# Patient Record
Sex: MALE | Race: BLACK OR AFRICAN AMERICAN | NOT HISPANIC OR LATINO | Employment: FULL TIME | URBAN - METROPOLITAN AREA
[De-identification: names, ages, dates, MRNs, and addresses within clinical notes are randomized per-mention and may not be internally consistent; named-entity substitution may affect disease eponyms.]

---

## 2017-01-28 ENCOUNTER — HOSPITAL ENCOUNTER (EMERGENCY)
Facility: HOSPITAL | Age: 32
Discharge: HOME | End: 2017-01-28
Attending: EMERGENCY MEDICINE | Admitting: EMERGENCY MEDICINE
Payer: COMMERCIAL

## 2017-01-28 ENCOUNTER — APPOINTMENT (EMERGENCY)
Dept: RADIOLOGY | Facility: HOSPITAL | Age: 32
End: 2017-01-28
Payer: COMMERCIAL

## 2017-01-28 VITALS
BODY MASS INDEX: 40.43 KG/M2 | DIASTOLIC BLOOD PRESSURE: 90 MMHG | HEIGHT: 74 IN | TEMPERATURE: 99.2 F | SYSTOLIC BLOOD PRESSURE: 151 MMHG | RESPIRATION RATE: 18 BRPM | WEIGHT: 315 LBS | HEART RATE: 98 BPM | OXYGEN SATURATION: 97 %

## 2017-01-28 DIAGNOSIS — R68.89 FLU-LIKE SYMPTOMS: ICD-10-CM

## 2017-01-28 DIAGNOSIS — J45.901 ACUTE ASTHMA EXACERBATION: Primary | ICD-10-CM

## 2017-01-28 DIAGNOSIS — J06.9 VIRAL URI: ICD-10-CM

## 2017-01-28 PROCEDURE — 93005 ELECTROCARDIOGRAM TRACING: CPT | Performed by: EMERGENCY MEDICINE

## 2017-01-28 PROCEDURE — 99285 EMERGENCY DEPT VISIT HI MDM: CPT

## 2017-01-28 PROCEDURE — 71020 HB CHEST X-RAY 2VW FRONTAL&LATL: CPT

## 2017-01-28 PROCEDURE — 94640 AIRWAY INHALATION TREATMENT: CPT

## 2017-01-28 RX ORDER — PREDNISONE 50 MG/1
50 TABLET ORAL DAILY
Qty: 4 TABLET | Refills: 0 | Status: SHIPPED | OUTPATIENT
Start: 2017-01-28 | End: 2017-07-02

## 2017-01-28 RX ORDER — AMLODIPINE BESYLATE 10 MG/1
10 TABLET ORAL DAILY
COMMUNITY

## 2017-01-28 RX ORDER — ALBUTEROL SULFATE 2.5 MG/3ML
2.5 SOLUTION RESPIRATORY (INHALATION) ONCE
Status: COMPLETED | OUTPATIENT
Start: 2017-01-28 | End: 2017-01-28

## 2017-01-28 RX ORDER — IBUPROFEN 800 MG/1
800 TABLET ORAL EVERY 6 HOURS PRN
COMMUNITY

## 2017-01-28 RX ORDER — ALBUTEROL SULFATE 2.5 MG/3ML
5 SOLUTION RESPIRATORY (INHALATION) ONCE
Status: COMPLETED | OUTPATIENT
Start: 2017-01-28 | End: 2017-01-28

## 2017-01-28 RX ORDER — PREDNISONE 20 MG/1
60 TABLET ORAL ONCE
Status: COMPLETED | OUTPATIENT
Start: 2017-01-28 | End: 2017-01-28

## 2017-01-28 RX ORDER — IBUPROFEN 600 MG/1
600 TABLET ORAL ONCE
Status: COMPLETED | OUTPATIENT
Start: 2017-01-28 | End: 2017-01-28

## 2017-01-28 RX ADMIN — ALBUTEROL SULFATE 5 MG: 2.5 SOLUTION RESPIRATORY (INHALATION) at 03:15

## 2017-01-28 RX ADMIN — IPRATROPIUM BROMIDE 1 MG: 0.5 SOLUTION RESPIRATORY (INHALATION) at 03:16

## 2017-01-28 RX ADMIN — IBUPROFEN 600 MG: 600 TABLET ORAL at 02:59

## 2017-01-28 RX ADMIN — IPRATROPIUM BROMIDE 0.5 MG: 0.5 SOLUTION RESPIRATORY (INHALATION) at 02:35

## 2017-01-28 RX ADMIN — ALBUTEROL SULFATE 2.5 MG: 2.5 SOLUTION RESPIRATORY (INHALATION) at 02:35

## 2017-01-28 RX ADMIN — PREDNISONE 60 MG: 20 TABLET ORAL at 03:15

## 2017-01-30 LAB
ATRIAL RATE: 93 BPM
P AXIS: 59 DEGREES
PR INTERVAL: 158 MS
QRS AXIS: 21 DEGREES
QRSD INTERVAL: 86 MS
QT INTERVAL: 352 MS
QTC INTERVAL: 437 MS
T WAVE AXIS: 41 DEGREES
VENTRICULAR RATE: 93 BPM

## 2017-06-01 ENCOUNTER — HOSPITAL ENCOUNTER (EMERGENCY)
Facility: HOSPITAL | Age: 32
Discharge: HOME | End: 2017-06-01
Attending: EMERGENCY MEDICINE

## 2017-06-01 VITALS
WEIGHT: 315 LBS | HEIGHT: 74 IN | HEART RATE: 95 BPM | SYSTOLIC BLOOD PRESSURE: 137 MMHG | TEMPERATURE: 97.8 F | OXYGEN SATURATION: 99 % | DIASTOLIC BLOOD PRESSURE: 91 MMHG | RESPIRATION RATE: 16 BRPM | BODY MASS INDEX: 40.43 KG/M2
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
## 2017-06-01 DIAGNOSIS — S39.012A LUMBAR STRAIN, INITIAL ENCOUNTER: Primary | ICD-10-CM

## 2017-06-01 PROCEDURE — 99283 EMERGENCY DEPT VISIT LOW MDM: CPT

## 2017-06-01 RX ORDER — IBUPROFEN 400 MG/1
800 TABLET ORAL ONCE
Status: COMPLETED | OUTPATIENT
Start: 2017-06-01 | End: 2017-06-01

## 2017-06-01 RX ORDER — CYCLOBENZAPRINE HCL 10 MG
10 TABLET ORAL 3 TIMES DAILY PRN
Qty: 30 TABLET | Refills: 0 | Status: SHIPPED | OUTPATIENT
Start: 2017-06-01 | End: 2017-07-02

## 2017-06-01 RX ADMIN — IBUPROFEN 800 MG: 400 TABLET ORAL at 18:50

## 2017-07-02 ENCOUNTER — APPOINTMENT (EMERGENCY)
Dept: RADIOLOGY | Facility: HOSPITAL | Age: 32
End: 2017-07-02

## 2017-07-02 ENCOUNTER — HOSPITAL ENCOUNTER (EMERGENCY)
Facility: HOSPITAL | Age: 32
Discharge: HOME | End: 2017-07-02
Attending: EMERGENCY MEDICINE

## 2017-07-02 VITALS
RESPIRATION RATE: 16 BRPM | TEMPERATURE: 97.7 F | DIASTOLIC BLOOD PRESSURE: 89 MMHG | OXYGEN SATURATION: 97 % | SYSTOLIC BLOOD PRESSURE: 140 MMHG | HEIGHT: 74 IN | HEART RATE: 80 BPM | WEIGHT: 315 LBS | BODY MASS INDEX: 40.43 KG/M2

## 2017-07-02 DIAGNOSIS — M76.52 PATELLAR TENDINITIS, LEFT KNEE: Primary | ICD-10-CM

## 2017-07-02 PROCEDURE — 99283 EMERGENCY DEPT VISIT LOW MDM: CPT

## 2017-07-02 PROCEDURE — 73564 X-RAY EXAM KNEE 4 OR MORE: CPT

## 2017-07-02 RX ORDER — NAPROXEN 375 MG/1
375 TABLET ORAL 2 TIMES DAILY WITH MEALS
Qty: 20 TABLET | Refills: 0 | Status: SHIPPED | OUTPATIENT
Start: 2017-07-02 | End: 2017-09-26

## 2017-09-26 ENCOUNTER — HOSPITAL ENCOUNTER (EMERGENCY)
Facility: HOSPITAL | Age: 32
Discharge: HOME | End: 2017-09-26
Attending: EMERGENCY MEDICINE

## 2017-09-26 VITALS
WEIGHT: 315 LBS | HEART RATE: 84 BPM | RESPIRATION RATE: 16 BRPM | SYSTOLIC BLOOD PRESSURE: 140 MMHG | DIASTOLIC BLOOD PRESSURE: 88 MMHG | TEMPERATURE: 98.3 F | OXYGEN SATURATION: 100 % | HEIGHT: 74 IN | BODY MASS INDEX: 40.43 KG/M2

## 2017-09-26 DIAGNOSIS — M25.562 LEFT KNEE PAIN: ICD-10-CM

## 2017-09-26 DIAGNOSIS — L25.9 CONTACT DERMATITIS: ICD-10-CM

## 2017-09-26 DIAGNOSIS — R05.9 COUGH: ICD-10-CM

## 2017-09-26 DIAGNOSIS — R21 RASH: Primary | ICD-10-CM

## 2017-09-26 PROCEDURE — 99282 EMERGENCY DEPT VISIT SF MDM: CPT

## 2017-09-26 RX ORDER — PREDNISONE 20 MG/1
60 TABLET ORAL ONCE
Status: COMPLETED | OUTPATIENT
Start: 2017-09-26 | End: 2017-09-26

## 2017-09-26 RX ORDER — CYCLOBENZAPRINE HCL 10 MG
10 TABLET ORAL 3 TIMES DAILY PRN
COMMUNITY

## 2017-09-26 RX ORDER — PREDNISONE 20 MG/1
TABLET ORAL
Qty: 27 TABLET | Refills: 0 | Status: SHIPPED | OUTPATIENT
Start: 2017-09-26

## 2017-09-26 RX ADMIN — PREDNISONE 60 MG: 20 TABLET ORAL at 21:40

## 2017-09-27 NOTE — ED PROVIDER NOTES
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  Patient has a plaque-like rash with excoriations on his back, lower abdomen, and the medial surfaces of his bilateral thighs  Patient states that the burning and itching has worsened since onset  He denies any clear alleviating factors, note that touching worsens the pain  Clinically this is most consistent with a contact dermatitis of unclear etiology  He also notes several months of chronic cough  The patient has a history of asthma  He uses only a rescue inhaler and does not know if he has had pulmonary function tests before  He has not had any cough today  I encouraged him to follow with a family doctor that will provide regarding the potential need for daily asthma medications  He also notes 1-2 months of left knee pain  He denies any falls or trauma  Notes that the pain is diffuse but worse on the inferior aspect of the patella  Patient notes that the pain is worse with movement, particularly climbing stairs  Examination most concerning for potential meniscal injury  I explained to patient and discussed symptomatic management  I discussed following up with his family physician regarding this  Impression and plan:  Rash with a broad differential   Based on patient's history and physical, most consistent with contact dermatitis  Will treat patient with extended course of prednisone considering the potential for urushiol toxicity  I discussed return precautions and follow-up in detail with the patient          Rash   Associated symptoms: joint pain    Associated symptoms: no abdominal pain, no diarrhea, no fatigue, no fever, no headaches, no induration, no myalgias, no nausea, no periorbital edema, no shortness of breath, no sore throat, no throat swelling, no tongue swelling, no URI, not vomiting and not wheezing        Prior to Admission Medications   Prescriptions Last Dose Informant Patient Reported? Taking? amLODIPine (NORVASC) 10 mg tablet  Self Yes Yes   Sig: Take 10 mg by mouth daily   cyclobenzaprine (FLEXERIL) 10 mg tablet   Yes Yes   Sig: Take 10 mg by mouth 3 (three) times a day as needed for muscle spasms   ibuprofen (MOTRIN) 800 mg tablet  Self Yes Yes   Sig: Take 800 mg by mouth every 6 (six) hours as needed for mild pain      Facility-Administered Medications: None       Past Medical History:   Diagnosis Date    Asthma     Hypertension     Pinched nerve     Back left side       Past Surgical History:   Procedure Laterality Date    HERNIA REPAIR         History reviewed  No pertinent family history  I have reviewed and agree with the history as documented  Social History   Substance Use Topics    Smoking status: Never Smoker    Smokeless tobacco: Current User    Alcohol use No        Review of Systems   Constitutional: Negative for fatigue and fever  HENT: Negative for sore throat  Respiratory: Positive for cough  Negative for shortness of breath and wheezing  Gastrointestinal: Negative for abdominal pain, diarrhea, nausea and vomiting  Musculoskeletal: Positive for arthralgias  Negative for myalgias  Skin: Positive for rash  Neurological: Negative for headaches  Physical Exam  ED Triage Vitals [09/26/17 2059]   Temperature Pulse Respirations Blood Pressure SpO2   98 3 °F (36 8 °C) 84 16 140/88 100 %      Temp Source Heart Rate Source Patient Position - Orthostatic VS BP Location FiO2 (%)   Oral Monitor Sitting Right arm --      Pain Score       8           Physical Exam   Constitutional: He appears well-developed and well-nourished  HENT:   Mouth/Throat: Oropharynx is clear and moist    Eyes: Pupils are equal, round, and reactive to light  Neck: Neck supple     Cardiovascular: Normal rate  Pulmonary/Chest: Effort normal    Abdominal: Soft  Musculoskeletal: He exhibits tenderness  Left knee:  Normal gait, bilateral leg length appears equal  Negative knee effusion, ecchymosis, edema, deformity, or defect  Active flexion to full, extension to full  Passive flexion to full, extension to full  Tenderness fairly diffusely over the anterior knee, most intensely over the inferior patella and the meniscuses  No tenderness noted over the quadriceps, pez anserine tendon  No tenderness noted in the poster aspect of the knee and this area is noted to be without fullness  Neurovascular exam: Normal dorsalis pedis and posterior tibial pulses and capillary refill  Sensory exam      Neurological: He is alert  Skin: Skin is warm and dry  Rash noted  Diffuse plaque like mildly erythematous rash with some excoriations on the patient's back  Milder on the lower abdomen and medial thighs  Psychiatric: He has a normal mood and affect  Vitals reviewed  ED Medications  Medications   predniSONE tablet 60 mg (60 mg Oral Given 9/26/17 2140)       Diagnostic Studies  Labs Reviewed - No data to display    No orders to display       Procedures  Procedures      Phone Contacts  ED Phone Contact    ED Course  ED Course                                MDM  CritCare Time    Disposition  Final diagnoses:   Rash   Contact dermatitis   Left knee pain   Cough     ED Disposition     ED Disposition Condition Comment    Discharge  Feliciano Nettlesist discharge to home/self care  Condition at discharge: Stable        Follow-up Information     Follow up With Specialties Details Why Betina Armstrong MD Internal Medicine Schedule an appointment as soon as possible for a visit in 3 days Follow up   [address] [place] [id number] [phone]          Discharge Medication List as of 9/26/2017  9:53 PM      START taking these medications    Details   predniSONE 20 mg tablet Take 3 tablets PO daily for 4 days, 2 tablets PO daily for 5 days, and 1 tablet PO daily for 5 days  , Print         CONTINUE these medications which have NOT CHANGED    Details   amLODIPine (NORVASC) 10 mg tablet Take 10 mg by mouth daily, Until Discontinued, Historical Med      cyclobenzaprine (FLEXERIL) 10 mg tablet Take 10 mg by mouth 3 (three) times a day as needed for muscle spasms, Historical Med      ibuprofen (MOTRIN) 800 mg tablet Take 800 mg by mouth every 6 (six) hours as needed for mild pain, Until Discontinued, Historical Med           No discharge procedures on file      ED Provider  Electronically Signed by       Anastasia Eisenberg MD  09/28/17 2835

## 2017-09-27 NOTE — DISCHARGE INSTRUCTIONS
Acute Rash   WHAT YOU NEED TO KNOW:   A rash is irritation, redness, or itchiness in the skin or mucus membranes  Mucus membranes are areas such as the lining of your nose or throat  Acute means the rash starts suddenly, worsens quickly, and lasts a short time  An acute rash may be caused by a disease, such as hepatitis or vasculitis  The rash may be a reaction to something you are allergic to, such as certain foods, or latex  Certain medicines, including antibiotics, NSAIDs, prescription pain medicine, and aspirin can also cause a rash  DISCHARGE INSTRUCTIONS:   Return to the emergency department if:   · You have sudden trouble breathing or chest pain  · You are vomiting, have a headache or muscle aches, and your throat hurts  Contact your healthcare provider if:   · You have a fever  · You get open wounds from scratching your skin, or you have a wound that is red, swollen, or painful  · Your rash lasts longer than 3 months  · You have swelling or pain in your joints  · You have questions or concerns about your condition or care  Medicines:  If your rash does not go away on its own, you may need the following medicines:  · Antihistamines  may be given to help decrease itching  · Steroids  may be given to decrease inflammation  · Antibiotics  help fight or prevent a bacterial infection  · Take your medicine as directed  Contact your healthcare provider if you think your medicine is not helping or if you have side effects  Tell him of her if you are allergic to any medicine  Keep a list of the medicines, vitamins, and herbs you take  Include the amounts, and when and why you take them  Bring the list or the pill bottles to follow-up visits  Carry your medicine list with you in case of an emergency  Prevent a rash or care for your skin when you have a rash:  Dry skin can lead to more problems  Do not scratch your skin if it itches  You may cause a skin infection by scratching   The following may prevent dry skin, and help your skin look better:  · Use thick cream lotions or petroleum jelly to help soothe your rash  These products work well on areas with thick skin, such as your feet  Cool compresses may also be used to soothe your skin  Apply a cool compress or a cool, wet towel, and then cover it with a dry towel  · Use lukewarm water when you bathe  Hot water may damage your skin more  Pat your skin dry  Do not rub your skin with a towel  · Use detergents, soaps, shampoos, and bubble baths made for sensitive skin  Wear clothes that are made of cotton instead of nylon or wool  Cotton is softer, so it will not hurt your skin as much  Follow up with your healthcare provider as directed: You may need to see a dermatologist if healthcare providers do not know what is causing your rash  You may also need to see a dermatologist if your rash does not get better even with treatment  You may need to see a dietitian if you have allergies to foods  Write down your questions so you remember to ask them during your visits  © 2017 [facility] Information is for End User's use only and may not be sold, redistributed or otherwise used for commercial purposes  All illustrations and images included in CareNotes® are the copyrighted property of [employer]  or [employer]  The above information is an  only  It is not intended as medical advice for individual conditions or treatments  Talk to your doctor, nurse or pharmacist before following any medical regimen to see if it is safe and effective for you  Contact Dermatitis   WHAT YOU NEED TO KNOW:   Contact dermatitis is a skin rash  It develops when you touch something that irritates your skin or causes an allergic reaction  DISCHARGE INSTRUCTIONS:   Call 911 for any of the following:   · You have sudden trouble breathing  · Your throat swells and you have trouble eating      · Your face is swollen  Contact your healthcare provider if:   · You have a fever  · Your blisters are draining pus  · Your rash spreads or does not get better, even after treatment  · You have questions or concerns about your condition or care  Medicines:   · Medicines  help decrease itching and swelling  They will be given as a topical medicine to apply to your rash or as a pill  · Take your medicine as directed  Contact your healthcare provider if you think your medicine is not helping or if you have side effects  Tell him or her if you are allergic to any medicine  Keep a list of the medicines, vitamins, and herbs you take  Include the amounts, and when and why you take them  Bring the list or the pill bottles to follow-up visits  Carry your medicine list with you in case of an emergency  Manage contact dermatitis:   · Take short baths or showers in cool water  Use mild soap or soap-free cleansers  Add oatmeal, baking soda, or cornstarch to the bath water to help decrease skin irritation  · Avoid skin irritants , such as makeup, hair products, soaps, and cleansers  Use products that do not contain perfume or dye  · Apply a cool compress to your rash  This will help soothe your skin  · Keep your skin moist   Rub unscented cream or lotion on your skin to prevent dryness and itching  Do this right after a bath or shower when your skin is still damp  Follow up with your healthcare provider or dermatologist in 2 to 3 days:  Write down your questions so you remember to ask them during your visits  © 2017 [address] [employer] Information is for End User's use only and may not be sold, redistributed or otherwise used for commercial purposes  All illustrations and images included in CareNotes® are the copyrighted property of [employer]  or [employer]  The above information is an  only  It is not intended as medical advice for individual conditions or treatments   Talk to your doctor, nurse or pharmacist before following any medical regimen to see if it is safe and effective for you